# Patient Record
(demographics unavailable — no encounter records)

---

## 2024-12-03 NOTE — ASSESSMENT
[FreeTextEntry1] : Physical Exam: Vasc: DP and PT pulses 2/4 b/l. TG: warm to warm. CFT wnl. No edema or erythema. NO varicosities Derm: no open lesions, no IDM MSK: patient reports feeling week in bilateral extremities Neuro: protective sensation intacty  A: Cancer-related weakness in b/l LE  P; Patient evaluated and chart created Discussed importance of conditioning during cancer disease and treatment Strongly recommend at-home Physical Therapy for mobility, transfers, and management of balance  Return to clinic 2 months

## 2024-12-03 NOTE — HISTORY OF PRESENT ILLNESS
[FreeTextEntry1] : 56 year old female presents to clinic for the first time, accompanied by her daughter who mostly speaks on her behalf. Patient is recently diagnosed with stage 4 renal carcinoma, PMH of pre diabetes. Patient is visible tired and mostly keeps her eyes closed throughout the visit. Was referred by PCP for evaluation of "balance issues" per daughter. Patient ambulates with a walker but has a tough time getting out of bed. Has not tried physical therapy. Reports no pedal complaints of pain.

## 2024-12-03 NOTE — HEALTH RISK ASSESSMENT
[Fair] :  ~his/her~ mood as fair [Never (0 pts)] : Never (0 points) [1 or 2 (0 pts)] : 1 or 2 (0 points) [No] : In the past 12 months have you used drugs other than those required for medical reasons? No [No falls in past year] : Patient reported no falls in the past year [Little interest or pleasure doing things] : 1) Little interest or pleasure doing things [Feeling down, depressed, or hopeless] : 2) Feeling down, depressed, or hopeless [1] : 2) Feeling down, depressed, or hopeless for several days (1) [PHQ-2 Negative - No further assessment needed] : PHQ-2 Negative - No further assessment needed [de-identified] : Heme-Onc (Se), Rheumaology [Audit-CScore] : 0 [de-identified] : walking [de-identified] : regular [[id number]] : 2 [Patient reported mammogram was normal] : Patient reported mammogram was normal [Patient reported PAP Smear was normal] : Patient reported PAP Smear was normal [Patient reported colonoscopy was normal] : Patient reported colonoscopy was normal [HIV Test offered] : HIV Test offered [Hepatitis C test offered] : Hepatitis C test offered [Change in mental status noted] : No change in mental status noted [Language] : denies difficulty with language [Behavior] : denies difficulty with behavior [Learning/Retaining New Information] : denies difficulty learning/retaining new information [Handling Complex Tasks] : difficulty handling complex tasks [Reasoning] : denies difficulty with reasoning [Spatial Ability and Orientation] : denies difficulty with spatial ability and orientation [None] : None [With Family] : lives with family [# of Members in Household ___] :  household currently consist of [unfilled] member(s) [On disability] : on disability [Less Than High School] : less than high school [] :  [# Of Children ___] : has [unfilled] children [Sexually Active] : not sexually active [High Risk Behavior] : no high risk behavior [Feels Safe at Home] : Feels safe at home [Reports changes in hearing] : Reports changes in hearing [Reports changes in vision] : Reports no changes in vision [Reports normal functional visual acuity (ie: able to read med bottle)] : Reports normal functional visual acuity [Reports changes in dental health] : Reports no changes in dental health [Smoke Detector] : smoke detector [Carbon Monoxide Detector] : carbon monoxide detector [Safety elements used in home] : safety elements used in home [Seat Belt] :  uses seat belt [Sunscreen] : does not use sunscreen [Travel to Developing Areas] : does not  travel to developing areas [MammogramDate] : 2020 [MammogramComments] : schedule 1/2025 [PapSmearDate] : 2021 [ColonoscopyDate] : 2021 [de-identified] : needs assistance [de-identified] : needs assistance [de-identified] : 1/2024 [With Patient/Caregiver] : , with patient/caregiver [Name: ___] : Health Care Proxy's Name: [unfilled]  [Relationship: ___] : Relationship: [unfilled] [AdvancecareDate] : 12/3/2024 [Never] : Never [NO] : No

## 2024-12-03 NOTE — HISTORY OF PRESENT ILLNESS
[Family Member] : family member [FreeTextEntry1] : establish care [de-identified] : Patient is a 57 y/o F accompanied by her daughter, Ms. Kat Lloyd who also serves as her . She has PMHx of RCCA Stage IV w/ lung and spine mets on radiation therapy, RA, chronic constipation, pre-DM. She denies any headache, dizziness, nausea, vomiting, cough, fever, chest pain, shortness of breath, palpitation, heartburn, abdominal pain, diarrhea, dysuria, hematuria, back pain, joint pain, weight loss, loss of appetite

## 2024-12-03 NOTE — PHYSICAL EXAM
[No Acute Distress] : no acute distress [Well-Appearing] : well-appearing [Cachectic] : cachexia was observed [Well Developed] : well developed [Well Nourished] : well nourished [Normal Sclera/Conjunctiva] : normal sclera/conjunctiva [EOMI] : extraocular movements intact [Conjunctiva] : the conjunctiva were normal in both eyes [PERRL] : pupils were equal in size, round, and reactive to light [EOM Intact] : extraocular movements were intact [Normal Outer Ear/Nose] : the outer ears and nose were normal in appearance [Normal Oropharynx] : the oropharynx was normal [No JVD] : no jugular venous distention [No Lymphadenopathy] : no lymphadenopathy [Supple] : supple [Thyroid Normal, No Nodules] : the thyroid was normal and there were no nodules present [Normal Appearance] : was normal in appearance [Neck Supple] : was supple [Enlarged Diffusely] : was not enlarged [No Respiratory Distress] : no respiratory distress  [No Accessory Muscle Use] : no accessory muscle use [Clear to Auscultation] : lungs were clear to auscultation bilaterally [Rate ___] : at [unfilled] breaths per minute [Normal Rhythm/Effort] : normal respiratory rhythm and effort [Clear Bilaterally] : the lungs were clear to auscultation bilaterally [Normal to Percussion] : the lungs were normal to percussion [Regular Rhythm] : with a regular rhythm [Normal S1, S2] : normal S1 and S2 [No Carotid Bruits] : no carotid bruits [No Abdominal Bruit] : a ~M bruit was not heard ~T in the abdomen [No Varicosities] : no varicosities [Pedal Pulses Present] : the pedal pulses are present [No Edema] : there was no peripheral edema [No Palpable Aorta] : no palpable aorta [No Extremity Clubbing/Cyanosis] : no extremity clubbing/cyanosis [Normal Rate] : normal [Heart Rate ___] : [unfilled] bpm [Normal S1] : normal S1 [Normal S2] : normal S2 [S3] : no S3 [S4] : no S4 [No Murmur] : no murmurs heard [No Pitting Edema] : no pitting edema present [Right Carotid Bruit] : no bruit heard over the right carotid [Left Carotid Bruit] : no bruit heard over the left carotid [Right Femoral Bruit] : no bruit heard over the right femoral artery [Left Femoral Bruit] : no bruit heard over the left femoral artery [2+] : left 2+ [No Abnormalities] : the abdominal aorta was not enlarged and no bruit was heard [Declined Breast Exam] : declined breast exam  [Soft] : abdomen soft [Non Tender] : non-tender [Non-distended] : non-distended [Normal Bowel Sounds] : normal bowel sounds [Soft, Nontender] : the abdomen was soft and nontender [No Mass] : no masses were palpated [No HSM] : no hepatosplenomegaly noted [Normal Posterior Cervical Nodes] : no posterior cervical lymphadenopathy [Normal Anterior Cervical Nodes] : no anterior cervical lymphadenopathy [Cervical Lymph Nodes Enlarged Posterior Bilaterally] : nodes not enlarged [Supraclavicular Lymph Nodes Enlarged Bilaterally] : nodes not enlarged [Axillary Lymph Nodes Enlarged Bilaterally] : nodes not enlarged [Inguinal Lymph Nodes Enlarged Bilaterally] : nodes not enlarged [No CVA Tenderness] : no CVA  tenderness [No Spinal Tenderness] : no spinal tenderness [Normal Kyphosis] : normal kyphosis [No Visual Abnormalities] : no visible abnormalities [Normal Lordosis] : normal lordosis [No Scoliosis] : no scoliosis [No Tenderness to Palpation] : no spine tenderness on palpation [No Masses] : no masses [Full ROM] : full ROM [No Pain with ROM] : no pain with motion in any direction [Intact] : all reflexes within normal limits bilaterally [No Joint Swelling] : no joint swelling [Grossly Normal Strength/Tone] : grossly normal strength/tone [Normal Station and Gait] : the gait and station were normal [Normal Motor Tone] : the muscle tone was normal [Involuntary Movements] : no involuntary movements were seen [No Rash] : no rash [Abnormal Color] : normal color and pigmentation [Skin Lesions 1] : no skin lesions were observed [Skin Turgor Decreased] : normal skin turgor [Coordination Grossly Intact] : coordination grossly intact [No Focal Deficits] : no focal deficits [Normal Gait] : normal gait [Deep Tendon Reflexes (DTR)] : deep tendon reflexes were 2+ and symmetric [Normal] : the deep tendon reflexes were normal [Normal Affect] : the affect was normal [Normal Insight/Judgement] : insight and judgment were intact [Normal Mental Status] : the patient's orientation, memory, attention, language and fund of knowledge were normal [Appropriate] : appropriate [Impaired judgment] : intact judgment [Impaired Insight] : intact insight [de-identified] : dry tongue, good dentition [de-identified] : defer [de-identified] : (+) calluses

## 2024-12-06 NOTE — PLAN
[FreeTextEntry1] : 56 F, PMHx of stage 4 RCC w/ mets to spine and lungs, presents for a follow up visit after being sent to the ED for blood transfusion.   #Lethargy - p/w lethargy and decreased PO intake - likely 2/2 hyponatremia vs anemia vs cancer   #Anemia - Hb 6.9 on 12/3, sent to ED and transfused 1U pRBC on 11/4  #Hyponatremia - Na 126 on 12/3   #Left calf pain - left calf pain and tenderness - concern for DVT given hypercoagulable state, will need duplex   #Cancer-related pain - 2/2 stage 4 RCC w/ mets to lungs and spine - has intractable generalized pain - c/w oxycontin - prescribed lidocaine patch and Tylenol   Will send patient to ED given concern of pronounced weakness, lethargy, intractable cancer-related pain, and new symptoms of severe left calf pain and tenderness concerning for possible DVT. Will likely need further evaluation and management of electrolyte imbalance and pain control.   Total time spent caring for the patient today was 30 minutes. This includes time spent before the visit reviewing the chart, time spent during visit, and time spent after the visit on documentation, etc.

## 2024-12-06 NOTE — HISTORY OF PRESENT ILLNESS
[FreeTextEntry1] : post-transfusion follow-up [de-identified] : 56 F, PMHx of stage 4 RCC w/ mets to spine and lungs, presents for a follow up visit after being sent to the ED for blood transfusion. Hb 6.9 last week, will repeat CBC today. Patient complains of cancer-related pain, takes oxycontin. Is more lethargic than last week, has decreased PO intake. Also complains of left calf pain and tenderness.

## 2024-12-06 NOTE — REVIEW OF SYSTEMS
[Fatigue] : fatigue [Negative] : ENT [Fever] : no fever [Night Sweats] : no night sweats [Chest Pain] : no chest pain [Shortness Of Breath] : no shortness of breath [Cough] : no cough [Dyspnea on Exertion] : no dyspnea on exertion [Nausea] : no nausea [Constipation] : no constipation [Diarrhea] : diarrhea [Vomiting] : no vomiting [Dysuria] : no dysuria [Incontinence] : no incontinence [Frequency] : no frequency [Joint Pain] : no joint pain [Joint Stiffness] : no joint stiffness [FreeTextEntry7] : lower abdominal pain [FreeTextEntry9] : left calf pain

## 2024-12-06 NOTE — HEALTH RISK ASSESSMENT
[1] : 2) Feeling down, depressed, or hopeless for several days (1) [PHQ-2 Negative - No further assessment needed] : PHQ-2 Negative - No further assessment needed [[id number]] : 2

## 2024-12-06 NOTE — END OF VISIT
[] : Resident [FreeTextEntry3] : I personally discussed this patient with the Resident at the time of the visit. I agree with the assessment and plan as written, unless noted below.I have personally examined the patient. All questions addressed. Advised to follow-up in  I personally discussed this patient with the Resident at the time of the visit. I agree with the assessment and plan as written, unless noted below. I was present with the Resident during the key portions of the history and exam. I agree with the findings and plan as documented in the Resident 's note, unless noted below. I, Dr. Adelso Guzman, saw and evaluated this patient in the presence of the resident. I discussed the management with the resident. I reviewed the note and agreed with the documented plan of care with the following confirmation/ corrections/ additions: None. [Time Spent: ___ minutes] : I have spent [unfilled] minutes of time on the encounter which excludes teaching and separately reported services.

## 2024-12-06 NOTE — ADDENDUM
[FreeTextEntry1] : Called [facility] ED. Spoke to the Triage Nurse. Wrote a letter expressing our concerns regarding her increasing lethargy and intractable pain. Spoke to the patient's family at bedside. Showed understanding of the situation and expressed their gratitude.

## 2024-12-06 NOTE — PHYSICAL EXAM
[Well Developed] : well developed [Normal Sclera/Conjunctiva] : normal sclera/conjunctiva [Normal Outer Ear/Nose] : the outer ears and nose were normal in appearance [Supple] : supple [No Respiratory Distress] : no respiratory distress  [Clear to Auscultation] : lungs were clear to auscultation bilaterally [Normal Rate] : normal rate  [Normal S1, S2] : normal S1 and S2 [No Murmur] : no murmur heard [Pedal Pulses Present] : the pedal pulses are present [No Edema] : there was no peripheral edema [Soft] : abdomen soft [Non Tender] : non-tender [de-identified] : lethargic, sleepy [de-identified] : L calf tenderness [de-identified] : pale

## 2025-01-23 NOTE — DISCUSSION/SUMMARY
[FreeTextEntry1] : The visit was provided via telehealth using real-time 2-way audio visual technology. The patient, Noemi Solano, was located at home, [place], [place], at the time of the visit. The Genetic Counselor, Emily Nixon, was located at the medical office located in [place], [place]. The patient and the Genetic Counselor both participated in the telehealth encounter. Genetic counseling student, Iraida Arias, also participated. Consent for telehealth services was given on 2025 by the patient, Noemi Solano.  REASON FOR CONSULT Noemi Solano is a 57-year-old female who was referred by Dr. Cristina Stroud for cancer genetic counseling and risk assessment due to a personal history of kidney cancer. She was accompanied by her daughter and appointed health care proxy (documentation is in [employer] and will be scanned into her chart), Elaina Lloyd.  RELEVANT MEDICAL HISTORY Ms. Gilmar Solano was diagnosed with kidney cancer in 10/2024 at 56 years old. Ms. Gilmar Solano underwent a Chest CT in 10/2024 which revealed numerous heterogenous and solid pulmonary nodules/masses measuring up to 3/9 cm for necrotic foci of metastasis as well as sclerosis of the first lumbar vertebral body with chronic compression fracture deformity, concerning for metastasis. CTAP performed in 10/2024 revealed heterogenous and necrotic-appearing mass arising from the lower pole of the left kidney, likely representing primary renal cell carcinoma, with an expansile tumor extension into the left renal vein. Additionally, 3.5 cm soft tissue lesion involving the left hemisacrum and left sacroiliac joint compatible with osseous metastasis as well as additional sclerotic change and mild loss of vertebral height of the T12 vertebral body may also represent osseous metastasis. A lumbar spine MRI was performed in 10/2024 which revealed a left renal mass compatible with renal cell carcinoma measures at least 10.5 cm with extension into the left renal vein as well as T2 hyperintense lesion in the left sacral ala with additional smaller T2 hyperintense vertebral lesions at T12, findings likely metastatic in etiology. Most recent PET performed in 2024 revealed FDG avid left renal mass, consistent with primary renal cell carcinoma and at least one right renal lesion is FDG avid concerning for malignancy, FDG avid pulmonary metastasis, FDG avid left adrenal metastasis, FDG avid retroperitoneal and perinephric nodules, FDG avid paraesophageal lymph node, consistent with metastases, and a few FDG avid bone metastases. Ms. Gilmar Solano underwent a right upper lobe lung endobronchial biopsy in 2024 which revealed atypical epithelial cells in a background of extensive necrosis (note on pathology states that findings together with clinical history of large renal mass are suggestive of metastatic renal cell carcinoma). She underwent five days of palliative radiation therapy to target back pain. Her daughter stated that she has recently moved to hospice care.   Of note, [facility] [employer] somatic tumor testing was sent, however, the test was unable to be performed as the sample failed.   OTHER MEDICAL AND SURGICAL HISTORY:  -	 Medical History: anemia, rheumatoid arthritis -	Surgical History: right lung upper lobe biopsy (2024), elbow surgery   PAST OB/GYN HISTORY:  Obstetrical History:   Age at Menarche: 14  Menopausal with LMP at age 48   Age at First Live Birth: 22 Oral Contraceptive Use: No Other Contraceptive Use: IUD, approximately 7 years in total Hormone Replacement Therapy: No   CANCER SCREENING HISTORY:    Breast:  -	Mammography: last 2024, grouping calcifications were identified in the left breast and a right breast asymmetry was also identified. Additional imaging was recommended.  -	Sonography: No -	MRI: No -	Biopsies: No GYN: -	Pelvic Examination: last , reportedly normal -	Sonography: No -	CA-125: No Colon: -	Colonoscopy: last , patient reports one polyp that measured approximately 10 millimeters was identified. Next colonoscopy was recommended in 3 years. Patient denies a history of colorectal polyps on previous colonoscopies.  -	Upper Endoscopy: last , patient reports that she is unsure of the results.  Skin:   -	FBSE: No -	Lesions biopsied/removed: No  SOCIAL HISTORY: -	Tobacco-product use: No  FAMILY HISTORY: Maternal ancestry and paternal ancestry were reported as [place]ian. Ashkenazi Amish ancestry and consanguinity were denied. A detailed family history of cancer was ascertained. Relevant diagnoses are detailed below and in the scanned pedigree.   To Ms. Gilmar Solano's knowledge, no one in the family has had germline testing for cancer susceptibility.   	 	RISK ASSESSMENT: Ms. Gilmar Solano's personal history of metastatic renal cancer is not highly suggestive of an inherited predisposition to cancer; however, we are unable to determine renal cancer pathology at this time and it is reasonable to pursue genetic testing for genes associated with renal cancer in the event, the pathology is a pathology that is commonly associated with hereditary renal cancer. We recommended genetic testing for genes associated with renal cancer. This test analyzes the following genes: BAP1, CHEK2, EPCAM, FH, FLCN, MET, MITF, MLH1, MSH2, MSH6, PMS2, PTEN, SDHA, SDHB, SDHC, SDHD, TP53, TSC1, TSC2, VHL.  We discussed the risks, benefits and limitations, and implications of genetic testing. We also discussed the psychosocial implications of genetic testing. Possible test results were reviewed with Ms. Gilmar Solano, along with associated medical management options.   Ms. Gilmar Solano's healthcare proxy, Elaina Lloyd, verbally consented to the above mentioned genetic testing panel. Informed consent form was emailed to the patient, and a saliva sample kit will be mailed to the patient. Once the sample has been collected and sent out, Ms. Gilmar Solano will inform us.   PLAN:  1.	Saliva kit was sent to Ms. Gilmar Solano's home for collection. Once collected and dropped off, patient will inform us.  2.	Ms. Gilmar Solano was sent a copy of the informed consent via email to be filled, signed, and returned to us. 3.	We will contact Ms. Gilmar Solano once the results are available and will schedule a follow-up appointment, as needed. Results generally return in 2-3 weeks from the day the sample is received in the lab.  For any additional questions please call Cancer Genetics at [phone].    Emily Nixon MS, [facility] Genetic Counselor, Cancer Genetics   CC:  Dr. Cristina Stroud

## 2025-04-11 NOTE — DISCUSSION/SUMMARY
[FreeTextEntry1] : RESULTS TRANSMISSION Noemi Solano is a 57-year-old female whose daughter and healthcare proxy, Elaina, was called on 04/03/2025 for a discussion regarding her mother's genetic testing results related to hereditary cancer predisposition.   Ms. Gilmar Solano was originally seen at [facility] on 01/22/2025 for hereditary cancer predisposition risk assessment due to a personal history of cancer. Ms. Gilmar Solano decided to pursue genetic testing for genes associated with kidney cancer offered by [facility].  TEST RESULTS: NEGATIVE  No pathogenic (disease-causing) variants or VUSs were detected in the following genes:  BAP1, CHEK2, EPCAM, FH, FLCN, MET, MITF, MLH1, MSH2, MSH6, PMS2, PTEN, SDHA, SDHB, SDHC, SDHD, TP53, TSC1, TSC2, VHL.  RESULTS INTERPRETATION AND ASSESSMENT: Given Ms. Gilmar Solano's personal and current reported family history of cancer, and her negative genetic test results, the following screening guidelines and risk-reducing recommendations were discussed:  KIDNEY: - Long-term management and surveillance should be based on Ms. Gilmar Solano's on- or post-treatment protocol as recommended by her oncology team.  OTHER:  - In the absence of other indications, Ms. Gilmar Solano should practice age-appropriate cancer screening of other organ systems as recommended for the general population.  We also discussed that, while no cause of the patient's personal and family history of cancer was identified, this result, while reassuring, does entirely not rule out a hereditary cancer risk in the patient. It is possible, although unlikely, the patient has a mutation in one of the genes tested that is not detectable by this analysis, or has a mutation in a different gene, either known or unknown. It is also possible there is a hereditary cancer predisposition in the family, but the patient did not inherit it.  We informed Ms. Gilmar Solano that our knowledge of genetics and inherited cancer conditions is changing rapidly. Therefore, we recommended that Ms. Gilmar Solano contact our office, every 2 to 3 years, to discuss relevant advances in cancer genetics.  We emphasized the importance of re-contacting us with updates regarding her personal and family history of cancer as well as any updates regarding additional cancer genetic test results performed for the patient and/or family members.  Such updates could possibly change our risk assessment and recommendations.   We discussed with Elaina that due to her history of "complex QRS tachycardia," she could be seen by Medical Genetics for an evaluation related to her cardiac history and she was provided with contact information for [facility]'s Medical Genetics department.  PLAN: 1.	See above for recommended screening and risk-reduction strategies. 2.	Patient informed consult note(s) will be available through their [facility] patient portal and genetic test results will be released via [facility]'s laboratory portal.  3.	Ms. Gilmar Solano was encouraged to contact us every 2-3 years to discuss relevant advances in cancer genetics, or sooner if there are any changes in her personal or family history of cancer.   For any additional questions please call Cancer Genetics at [phone].    Emily Nixon MS, [facility] Genetic Counselor, Cancer Genetics   CC:  Patient Dr. Cristina Stroud